# Patient Record
Sex: FEMALE | Race: OTHER | NOT HISPANIC OR LATINO
[De-identification: names, ages, dates, MRNs, and addresses within clinical notes are randomized per-mention and may not be internally consistent; named-entity substitution may affect disease eponyms.]

---

## 2017-08-31 PROBLEM — Z00.00 ENCOUNTER FOR PREVENTIVE HEALTH EXAMINATION: Status: ACTIVE | Noted: 2017-08-31

## 2017-09-06 ENCOUNTER — ASOB RESULT (OUTPATIENT)
Age: 27
End: 2017-09-06

## 2017-09-06 ENCOUNTER — APPOINTMENT (OUTPATIENT)
Dept: ANTEPARTUM | Facility: CLINIC | Age: 27
End: 2017-09-06
Payer: MEDICAID

## 2017-09-06 PROCEDURE — 76805 OB US >/= 14 WKS SNGL FETUS: CPT

## 2017-11-15 ENCOUNTER — OUTPATIENT (OUTPATIENT)
Dept: OUTPATIENT SERVICES | Facility: HOSPITAL | Age: 27
LOS: 1 days | End: 2017-11-15

## 2017-11-15 DIAGNOSIS — O09.90 SUPERVISION OF HIGH RISK PREGNANCY, UNSPECIFIED, UNSPECIFIED TRIMESTER: ICD-10-CM

## 2017-11-15 LAB
APPEARANCE UR: SIGNIFICANT CHANGE UP
BACTERIA # UR AUTO: SIGNIFICANT CHANGE UP
BILIRUB UR-MCNC: NEGATIVE — SIGNIFICANT CHANGE UP
BLD GP AB SCN SERPL QL: NEGATIVE — SIGNIFICANT CHANGE UP
BLOOD UR QL VISUAL: NEGATIVE — SIGNIFICANT CHANGE UP
COLOR SPEC: SIGNIFICANT CHANGE UP
GLUCOSE UR-MCNC: NEGATIVE — SIGNIFICANT CHANGE UP
HCT VFR BLD CALC: 31.1 % — LOW (ref 34.5–45)
HGB BLD-MCNC: 9.2 G/DL — LOW (ref 11.5–15.5)
KETONES UR-MCNC: NEGATIVE — SIGNIFICANT CHANGE UP
LEUKOCYTE ESTERASE UR-ACNC: NEGATIVE — SIGNIFICANT CHANGE UP
MCHC RBC-ENTMCNC: 22.9 PG — LOW (ref 27–34)
MCHC RBC-ENTMCNC: 29.6 % — LOW (ref 32–36)
MCV RBC AUTO: 77.4 FL — LOW (ref 80–100)
MUCOUS THREADS # UR AUTO: SIGNIFICANT CHANGE UP
NITRITE UR-MCNC: NEGATIVE — SIGNIFICANT CHANGE UP
NRBC # FLD: 0 — SIGNIFICANT CHANGE UP
PH UR: 7.5 — SIGNIFICANT CHANGE UP (ref 4.6–8)
PLATELET # BLD AUTO: 235 K/UL — SIGNIFICANT CHANGE UP (ref 150–400)
PMV BLD: 10.2 FL — SIGNIFICANT CHANGE UP (ref 7–13)
PROT UR-MCNC: 30 — HIGH
RBC # BLD: 4.02 M/UL — SIGNIFICANT CHANGE UP (ref 3.8–5.2)
RBC # FLD: 18.2 % — HIGH (ref 10.3–14.5)
RBC CASTS # UR COMP ASSIST: SIGNIFICANT CHANGE UP (ref 0–?)
RH IG SCN BLD-IMP: POSITIVE — SIGNIFICANT CHANGE UP
SP GR SPEC: 1.03 — SIGNIFICANT CHANGE UP (ref 1–1.03)
SQUAMOUS # UR AUTO: SIGNIFICANT CHANGE UP
UROBILINOGEN FLD QL: NORMAL E.U. — SIGNIFICANT CHANGE UP (ref 0.1–0.2)
WBC # BLD: 8.1 K/UL — SIGNIFICANT CHANGE UP (ref 3.8–10.5)
WBC # FLD AUTO: 8.1 K/UL — SIGNIFICANT CHANGE UP (ref 3.8–10.5)
WBC UR QL: SIGNIFICANT CHANGE UP (ref 0–?)

## 2017-11-15 RX ORDER — SODIUM CHLORIDE 9 MG/ML
1000 INJECTION, SOLUTION INTRAVENOUS
Qty: 0 | Refills: 0 | Status: DISCONTINUED | OUTPATIENT
Start: 2017-11-30 | End: 2017-11-30

## 2017-11-15 RX ORDER — METOCLOPRAMIDE HCL 10 MG
10 TABLET ORAL ONCE
Qty: 0 | Refills: 0 | Status: COMPLETED | OUTPATIENT
Start: 2017-11-30 | End: 2017-11-30

## 2017-11-16 LAB — T PALLIDUM AB TITR SER: NEGATIVE — SIGNIFICANT CHANGE UP

## 2017-11-30 ENCOUNTER — INPATIENT (INPATIENT)
Facility: HOSPITAL | Age: 27
LOS: 2 days | Discharge: HOME | End: 2017-12-03
Attending: SPECIALIST | Admitting: SPECIALIST

## 2017-11-30 VITALS — WEIGHT: 149.91 LBS | HEIGHT: 65 IN

## 2017-11-30 DIAGNOSIS — O09.90 SUPERVISION OF HIGH RISK PREGNANCY, UNSPECIFIED, UNSPECIFIED TRIMESTER: ICD-10-CM

## 2017-11-30 LAB
BLD GP AB SCN SERPL QL: NEGATIVE — SIGNIFICANT CHANGE UP
RH IG SCN BLD-IMP: POSITIVE — SIGNIFICANT CHANGE UP

## 2017-11-30 RX ORDER — TETANUS TOXOID, REDUCED DIPHTHERIA TOXOID AND ACELLULAR PERTUSSIS VACCINE, ADSORBED 5; 2.5; 8; 8; 2.5 [IU]/.5ML; [IU]/.5ML; UG/.5ML; UG/.5ML; UG/.5ML
0.5 SUSPENSION INTRAMUSCULAR ONCE
Qty: 0 | Refills: 0 | Status: DISCONTINUED | OUTPATIENT
Start: 2017-11-30 | End: 2017-12-03

## 2017-11-30 RX ORDER — HYDROMORPHONE HYDROCHLORIDE 2 MG/ML
0.5 INJECTION INTRAMUSCULAR; INTRAVENOUS; SUBCUTANEOUS
Qty: 0 | Refills: 0 | Status: DISCONTINUED | OUTPATIENT
Start: 2017-11-30 | End: 2017-11-30

## 2017-11-30 RX ORDER — OXYTOCIN 10 UNIT/ML
41.67 VIAL (ML) INJECTION
Qty: 20 | Refills: 0 | Status: DISCONTINUED | OUTPATIENT
Start: 2017-11-30 | End: 2017-12-01

## 2017-11-30 RX ORDER — SODIUM CHLORIDE 9 MG/ML
1000 INJECTION, SOLUTION INTRAVENOUS
Qty: 0 | Refills: 0 | Status: DISCONTINUED | OUTPATIENT
Start: 2017-11-30 | End: 2017-12-01

## 2017-11-30 RX ORDER — FAMOTIDINE 10 MG/ML
20 INJECTION INTRAVENOUS ONCE
Qty: 0 | Refills: 0 | Status: COMPLETED | OUTPATIENT
Start: 2017-11-30 | End: 2017-11-30

## 2017-11-30 RX ORDER — DOCUSATE SODIUM 100 MG
100 CAPSULE ORAL
Qty: 0 | Refills: 0 | Status: DISCONTINUED | OUTPATIENT
Start: 2017-11-30 | End: 2017-12-01

## 2017-11-30 RX ORDER — OXYCODONE HYDROCHLORIDE 5 MG/1
5 TABLET ORAL
Qty: 0 | Refills: 0 | Status: DISCONTINUED | OUTPATIENT
Start: 2017-11-30 | End: 2017-11-30

## 2017-11-30 RX ORDER — CITRIC ACID/SODIUM CITRATE 300-500 MG
30 SOLUTION, ORAL ORAL ONCE
Qty: 0 | Refills: 0 | Status: COMPLETED | OUTPATIENT
Start: 2017-11-30 | End: 2017-11-30

## 2017-11-30 RX ORDER — IBUPROFEN 200 MG
600 TABLET ORAL EVERY 6 HOURS
Qty: 0 | Refills: 0 | Status: COMPLETED | OUTPATIENT
Start: 2017-11-30 | End: 2018-10-29

## 2017-11-30 RX ORDER — HYDROMORPHONE HYDROCHLORIDE 2 MG/ML
1 INJECTION INTRAMUSCULAR; INTRAVENOUS; SUBCUTANEOUS
Qty: 0 | Refills: 0 | Status: DISCONTINUED | OUTPATIENT
Start: 2017-11-30 | End: 2017-11-30

## 2017-11-30 RX ORDER — NALOXONE HYDROCHLORIDE 4 MG/.1ML
0.1 SPRAY NASAL
Qty: 0 | Refills: 0 | Status: DISCONTINUED | OUTPATIENT
Start: 2017-11-30 | End: 2017-12-01

## 2017-11-30 RX ORDER — ACETAMINOPHEN 500 MG
975 TABLET ORAL EVERY 6 HOURS
Qty: 0 | Refills: 0 | Status: DISCONTINUED | OUTPATIENT
Start: 2017-11-30 | End: 2017-12-03

## 2017-11-30 RX ORDER — OXYCODONE HYDROCHLORIDE 5 MG/1
10 TABLET ORAL
Qty: 0 | Refills: 0 | Status: DISCONTINUED | OUTPATIENT
Start: 2017-11-30 | End: 2017-11-30

## 2017-11-30 RX ORDER — ONDANSETRON 8 MG/1
4 TABLET, FILM COATED ORAL EVERY 6 HOURS
Qty: 0 | Refills: 0 | Status: DISCONTINUED | OUTPATIENT
Start: 2017-11-30 | End: 2017-12-01

## 2017-11-30 RX ORDER — OXYCODONE HYDROCHLORIDE 5 MG/1
5 TABLET ORAL
Qty: 0 | Refills: 0 | Status: COMPLETED | OUTPATIENT
Start: 2017-11-30 | End: 2017-12-07

## 2017-11-30 RX ORDER — LANOLIN
1 OINTMENT (GRAM) TOPICAL
Qty: 0 | Refills: 0 | Status: DISCONTINUED | OUTPATIENT
Start: 2017-11-30 | End: 2017-12-03

## 2017-11-30 RX ORDER — SODIUM CHLORIDE 9 MG/ML
1000 INJECTION, SOLUTION INTRAVENOUS ONCE
Qty: 0 | Refills: 0 | Status: COMPLETED | OUTPATIENT
Start: 2017-11-30 | End: 2017-11-30

## 2017-11-30 RX ORDER — KETOROLAC TROMETHAMINE 30 MG/ML
30 SYRINGE (ML) INJECTION EVERY 6 HOURS
Qty: 0 | Refills: 0 | Status: DISCONTINUED | OUTPATIENT
Start: 2017-11-30 | End: 2017-12-01

## 2017-11-30 RX ORDER — DIPHENHYDRAMINE HCL 50 MG
25 CAPSULE ORAL EVERY 6 HOURS
Qty: 0 | Refills: 0 | Status: DISCONTINUED | OUTPATIENT
Start: 2017-11-30 | End: 2017-12-03

## 2017-11-30 RX ORDER — ONDANSETRON 8 MG/1
4 TABLET, FILM COATED ORAL ONCE
Qty: 0 | Refills: 0 | Status: DISCONTINUED | OUTPATIENT
Start: 2017-11-30 | End: 2017-11-30

## 2017-11-30 RX ORDER — HEPARIN SODIUM 5000 [USP'U]/ML
5000 INJECTION INTRAVENOUS; SUBCUTANEOUS EVERY 12 HOURS
Qty: 0 | Refills: 0 | Status: DISCONTINUED | OUTPATIENT
Start: 2017-11-30 | End: 2017-12-03

## 2017-11-30 RX ORDER — GLYCERIN ADULT
1 SUPPOSITORY, RECTAL RECTAL AT BEDTIME
Qty: 0 | Refills: 0 | Status: DISCONTINUED | OUTPATIENT
Start: 2017-11-30 | End: 2017-12-03

## 2017-11-30 RX ORDER — FERROUS SULFATE 325(65) MG
325 TABLET ORAL DAILY
Qty: 0 | Refills: 0 | Status: DISCONTINUED | OUTPATIENT
Start: 2017-11-30 | End: 2017-12-01

## 2017-11-30 RX ORDER — SIMETHICONE 80 MG/1
80 TABLET, CHEWABLE ORAL EVERY 4 HOURS
Qty: 0 | Refills: 0 | Status: DISCONTINUED | OUTPATIENT
Start: 2017-11-30 | End: 2017-12-03

## 2017-11-30 RX ORDER — BUTORPHANOL TARTRATE 2 MG/ML
0.12 INJECTION, SOLUTION INTRAMUSCULAR; INTRAVENOUS EVERY 6 HOURS
Qty: 0 | Refills: 0 | Status: DISCONTINUED | OUTPATIENT
Start: 2017-11-30 | End: 2017-11-30

## 2017-11-30 RX ORDER — OXYCODONE HYDROCHLORIDE 5 MG/1
5 TABLET ORAL EVERY 4 HOURS
Qty: 0 | Refills: 0 | Status: COMPLETED | OUTPATIENT
Start: 2017-12-01 | End: 2017-12-08

## 2017-11-30 RX ADMIN — SODIUM CHLORIDE 2000 MILLILITER(S): 9 INJECTION, SOLUTION INTRAVENOUS at 14:46

## 2017-11-30 RX ADMIN — Medication 125 MILLIUNIT(S)/MIN: at 19:30

## 2017-11-30 RX ADMIN — SODIUM CHLORIDE 125 MILLILITER(S): 9 INJECTION, SOLUTION INTRAVENOUS at 14:45

## 2017-11-30 RX ADMIN — FAMOTIDINE 20 MILLIGRAM(S): 10 INJECTION INTRAVENOUS at 14:45

## 2017-11-30 RX ADMIN — Medication 10 MILLIGRAM(S): at 14:45

## 2017-11-30 RX ADMIN — Medication 30 MILLILITER(S): at 14:45

## 2017-11-30 NOTE — PROGRESS NOTE ADULT - SUBJECTIVE AND OBJECTIVE BOX
Anesthesia s/p  section PACU note    In OR spinal was converted to a general anesthetic with endotracheal intubation for lack of clarity of the function of the epidural and assessing level according to the patient response.    Patient got spinal fentanyl and morphine only.  No IV narcotics intraoperatively.     Discussed with PACU nurse.  Patient has been comfortable requiring no narcotics.  Nurse said she did fundal pressure and patient was minimally uncomfortable.  After a general applying fundal pressure causes alot of pian.  Nurse report patient has been holding the baby and breast feeding without any problems.    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  Both agree patient has no pain, only when coughs.   agrees that his wife is comfortable.    Impression:    Patient has pain relief from the spinal morphine so wrote spinal narcotic 24 hour pain order protocol.  Felt there is no need for IVPCA.  Patient and  and PACU nurse agree patient has been very comfortable in the PACU.

## 2017-12-01 ENCOUNTER — TRANSCRIPTION ENCOUNTER (OUTPATIENT)
Age: 27
End: 2017-12-01

## 2017-12-01 DIAGNOSIS — Z98.891 HISTORY OF UTERINE SCAR FROM PREVIOUS SURGERY: ICD-10-CM

## 2017-12-01 LAB
BASOPHILS # BLD AUTO: 0.02 K/UL — SIGNIFICANT CHANGE UP (ref 0–0.2)
BASOPHILS NFR BLD AUTO: 0.2 % — SIGNIFICANT CHANGE UP (ref 0–2)
EOSINOPHIL # BLD AUTO: 0.07 K/UL — SIGNIFICANT CHANGE UP (ref 0–0.5)
EOSINOPHIL NFR BLD AUTO: 0.6 % — SIGNIFICANT CHANGE UP (ref 0–6)
HCT VFR BLD CALC: 28.4 % — LOW (ref 34.5–45)
HGB BLD-MCNC: 8.5 G/DL — LOW (ref 11.5–15.5)
IMM GRANULOCYTES # BLD AUTO: 0.06 # — SIGNIFICANT CHANGE UP
IMM GRANULOCYTES NFR BLD AUTO: 0.5 % — SIGNIFICANT CHANGE UP (ref 0–1.5)
LYMPHOCYTES # BLD AUTO: 1.4 K/UL — SIGNIFICANT CHANGE UP (ref 1–3.3)
LYMPHOCYTES # BLD AUTO: 11.7 % — LOW (ref 13–44)
MCHC RBC-ENTMCNC: 22.6 PG — LOW (ref 27–34)
MCHC RBC-ENTMCNC: 29.9 % — LOW (ref 32–36)
MCV RBC AUTO: 75.5 FL — LOW (ref 80–100)
MONOCYTES # BLD AUTO: 0.78 K/UL — SIGNIFICANT CHANGE UP (ref 0–0.9)
MONOCYTES NFR BLD AUTO: 6.5 % — SIGNIFICANT CHANGE UP (ref 2–14)
NEUTROPHILS # BLD AUTO: 9.62 K/UL — HIGH (ref 1.8–7.4)
NEUTROPHILS NFR BLD AUTO: 80.5 % — HIGH (ref 43–77)
NRBC # FLD: 0 — SIGNIFICANT CHANGE UP
PLATELET # BLD AUTO: 169 K/UL — SIGNIFICANT CHANGE UP (ref 150–400)
PMV BLD: 9.9 FL — SIGNIFICANT CHANGE UP (ref 7–13)
RBC # BLD: 3.76 M/UL — LOW (ref 3.8–5.2)
RBC # FLD: 18.2 % — HIGH (ref 10.3–14.5)
WBC # BLD: 11.95 K/UL — HIGH (ref 3.8–10.5)
WBC # FLD AUTO: 11.95 K/UL — HIGH (ref 3.8–10.5)

## 2017-12-01 RX ORDER — FERROUS SULFATE 325(65) MG
325 TABLET ORAL
Qty: 0 | Refills: 0 | Status: DISCONTINUED | OUTPATIENT
Start: 2017-12-01 | End: 2017-12-03

## 2017-12-01 RX ORDER — OXYCODONE HYDROCHLORIDE 5 MG/1
1 TABLET ORAL
Qty: 0 | Refills: 0 | COMMUNITY
Start: 2017-12-01

## 2017-12-01 RX ORDER — IBUPROFEN 200 MG
600 TABLET ORAL EVERY 6 HOURS
Qty: 0 | Refills: 0 | Status: DISCONTINUED | OUTPATIENT
Start: 2017-12-01 | End: 2017-12-03

## 2017-12-01 RX ORDER — OXYCODONE HYDROCHLORIDE 5 MG/1
5 TABLET ORAL EVERY 4 HOURS
Qty: 0 | Refills: 0 | Status: DISCONTINUED | OUTPATIENT
Start: 2017-12-01 | End: 2017-12-03

## 2017-12-01 RX ORDER — OXYCODONE HYDROCHLORIDE 5 MG/1
5 TABLET ORAL
Qty: 0 | Refills: 0 | Status: DISCONTINUED | OUTPATIENT
Start: 2017-12-01 | End: 2017-12-03

## 2017-12-01 RX ORDER — ASCORBIC ACID 60 MG
500 TABLET,CHEWABLE ORAL DAILY
Qty: 0 | Refills: 0 | Status: DISCONTINUED | OUTPATIENT
Start: 2017-12-01 | End: 2017-12-03

## 2017-12-01 RX ORDER — DOCUSATE SODIUM 100 MG
100 CAPSULE ORAL
Qty: 0 | Refills: 0 | Status: DISCONTINUED | OUTPATIENT
Start: 2017-12-01 | End: 2017-12-03

## 2017-12-01 RX ADMIN — HEPARIN SODIUM 5000 UNIT(S): 5000 INJECTION INTRAVENOUS; SUBCUTANEOUS at 12:31

## 2017-12-01 RX ADMIN — Medication 30 MILLIGRAM(S): at 00:40

## 2017-12-01 RX ADMIN — Medication 30 MILLIGRAM(S): at 06:45

## 2017-12-01 RX ADMIN — Medication 1 TABLET(S): at 12:31

## 2017-12-01 RX ADMIN — Medication 975 MILLIGRAM(S): at 18:23

## 2017-12-01 RX ADMIN — Medication 30 MILLIGRAM(S): at 01:10

## 2017-12-01 RX ADMIN — HEPARIN SODIUM 5000 UNIT(S): 5000 INJECTION INTRAVENOUS; SUBCUTANEOUS at 00:40

## 2017-12-01 RX ADMIN — Medication 30 MILLIGRAM(S): at 06:15

## 2017-12-01 RX ADMIN — Medication 325 MILLIGRAM(S): at 12:31

## 2017-12-01 RX ADMIN — Medication 975 MILLIGRAM(S): at 19:01

## 2017-12-01 RX ADMIN — Medication 100 MILLIGRAM(S): at 09:17

## 2017-12-01 RX ADMIN — Medication 30 MILLIGRAM(S): at 12:45

## 2017-12-01 RX ADMIN — Medication 975 MILLIGRAM(S): at 09:17

## 2017-12-01 RX ADMIN — Medication 975 MILLIGRAM(S): at 10:09

## 2017-12-01 RX ADMIN — Medication 600 MILLIGRAM(S): at 18:30

## 2017-12-01 RX ADMIN — Medication 30 MILLIGRAM(S): at 12:30

## 2017-12-01 RX ADMIN — Medication 600 MILLIGRAM(S): at 19:01

## 2017-12-01 RX ADMIN — SIMETHICONE 80 MILLIGRAM(S): 80 TABLET, CHEWABLE ORAL at 12:32

## 2017-12-01 NOTE — PROGRESS NOTE ADULT - SUBJECTIVE AND OBJECTIVE BOX
Postpartum Note,  Section  She is a  27y woman who is now post-operative day: 1    Subjective:  The patient feels well.  She is ambulating.   She is tolerating regular diet.  She denies nausea and vomiting.  She is voiding.  Her pain is controlled.  She reports normal postpartum bleeding.    Vital Signs Last 24 Hrs  T(C): 36.6 (01 Dec 2017 17:18), Max: 37.3 (01 Dec 2017 01:45)  T(F): 97.9 (01 Dec 2017 17:18), Max: 99.1 (01 Dec 2017 01:45)  HR: 91 (01 Dec 2017 17:18) (64 - 103)  BP: 98/64 (01 Dec 2017 17:18) (94/50 - 124/65)  BP(mean): 74 (2017 21:00) (71 - 81)  RR: 18 (01 Dec 2017 17:18) (14 - 21)  SpO2: 96% (01 Dec 2017 17:18) (94% - 100%)    Physical exam:  Gen: NAD  Breast: Soft, nontender, not engorged.  Abdomen: Soft, nontender, no distension , firm uterine fundus at umbilicus.  Incision: Clean, dry, and intact with steri strips  Pelvic: Normal lochia noted  Ext: No calf tenderness    LABS:                        8.5    11.95 )-----------( 169      ( 01 Dec 2017 06:00 )             28.4         Allergies    No Known Allergies    Intolerances      MEDICATIONS  (STANDING):  acetaminophen   Tablet. 975 milliGRAM(s) Oral every 6 hours  diphtheria/tetanus/pertussis (acellular) Vaccine (ADAcel) 0.5 milliLiter(s) IntraMuscular once  ferrous    sulfate 325 milliGRAM(s) Oral daily  heparin  Injectable 5000 Unit(s) SubCutaneous every 12 hours  ibuprofen  Tablet 600 milliGRAM(s) Oral every 6 hours  ketorolac   Injectable 30 milliGRAM(s) IV Push every 6 hours  lactated ringers. 1000 milliLiter(s) (125 mL/Hr) IV Continuous <Continuous>  oxyCODONE    IR 5 milliGRAM(s) Oral every 3 hours  oxytocin Infusion 41.667 milliUNIT(s)/Min (125 mL/Hr) IV Continuous <Continuous>  prenatal multivitamin 1 Tablet(s) Oral daily    MEDICATIONS  (PRN):  butorphanol Injectable 0.125 milliGRAM(s) IV Push every 6 hours PRN Pruritus  diphenhydrAMINE   Capsule 25 milliGRAM(s) Oral every 6 hours PRN Itching  docusate sodium 100 milliGRAM(s) Oral two times a day PRN Stool Softening  glycerin Suppository - Adult 1 Suppository(s) Rectal at bedtime PRN Constipation  HYDROmorphone  Injectable 1 milliGRAM(s) IV Push every 3 hours PRN Severe Pain  lanolin Ointment 1 Application(s) Topical every 3 hours PRN Sore Nipples  naloxone Injectable 0.1 milliGRAM(s) IV Push every 3 minutes PRN For ANY of the following changes in patient status:  A. RR LESS THAN 10 breaths per minute, B. Oxygen saturation LESS THAN 90%, C. Sedation score of 6  ondansetron Injectable 4 milliGRAM(s) IV Push every 6 hours PRN Nausea  oxyCODONE    IR 5 milliGRAM(s) Oral every 3 hours PRN Mild Pain  oxyCODONE    IR 10 milliGRAM(s) Oral every 3 hours PRN Moderate Pain  simethicone 80 milliGRAM(s) Chew every 4 hours PRN Gas        Assessment and Plan  POD # 1s/p  section  Doing well.  Encourage ambulation.

## 2017-12-01 NOTE — PROGRESS NOTE ADULT - SUBJECTIVE AND OBJECTIVE BOX
POST OP DAY  1  s/p   SECTION    SUBJECTIVE:  I'm ok.    PAIN SCALE SCORE: [x] Refer to charted pain scores    THERAPY:  [x ] Spinal morphine   [  ] Epidural morphine   [  ] IV PCA Hydromorphone 1 mg/ml    OBJECTIVE:  Comfortable Appearing    SEDATION SCORE:	  [ x ] Alert	    [  ] Drowsy        [  ] Arousable	[  ] Asleep	[  ] Unresponsive    Side Effects:	  [ x ] None	     [  ] Nausea        [  ] Pruritus        [  ] Weakness   [  ] Numbness        ASSESSMENT/ PLAN   [   ] Discontinue         [  ] Continue    [ x ] Change to prn Analgesics as per primary service.    DOCUMENTATION & VERIFICATION OF CURRENT MEDS [ x ] Done    COMMENTS: No Headache.

## 2017-12-01 NOTE — DISCHARGE NOTE OB - CARE PLAN
Principal Discharge DX:	S/P  section  Goal:	Routine postop care  Instructions for follow-up, activity and diet:	Follow up in 2 weeks / Regular diet & activity as tolerate

## 2017-12-01 NOTE — DISCHARGE NOTE OB - MEDICATION SUMMARY - MEDICATIONS TO TAKE
I will START or STAY ON the medications listed below when I get home from the hospital:    ibuprofen 600 mg oral tablet  -- 1 tab(s) by mouth every 6 hours, As needed, Mild pain or headache  -- Indication: For for pain    oxyCODONE 5 mg oral tablet  -- 1 tab(s) by mouth every 3 hours  -- Indication: For for moderate pain I will START or STAY ON the medications listed below when I get home from the hospital:    ibuprofen 600 mg oral tablet  -- 1 tab(s) by mouth every 6 hours, As Needed  -- Indication: For pain    acetaminophen 325 mg oral tablet  -- 3 tab(s) by mouth every 6 hours, As Needed  -- Indication: For pain    Prenatal Multivitamins with Folic Acid 1 mg oral tablet  -- 1 tab(s) by mouth once a day  -- Indication: For vitamins

## 2017-12-01 NOTE — LACTATION INITIAL EVALUATION - INTERVENTION OUTCOME
demonstrates understanding of teaching/verbalizes understanding/good return demonstration/Assisted pt with positioning to facilitate a deeper latch, encouraged "sandwich technique". Pt educated on hand expression, colostrum noted. Reviewed feeding on demand, recognizing feeding cues and utilizing feeding log. Pt encouraged to room in, safe skin to skin, safe sleep stressed.

## 2017-12-01 NOTE — DISCHARGE NOTE OB - PATIENT PORTAL LINK FT
“You can access the FollowHealth Patient Portal, offered by [facility], by registering with the following website: [email or web address]”

## 2017-12-01 NOTE — PROGRESS NOTE ADULT - SUBJECTIVE AND OBJECTIVE BOX
ANESTHESIA POSTOP CHECK    27y Female POSTOP DAY 1     No COMPLAINTS    NO APPARENT ANESTHESIA COMPLICATIONS

## 2017-12-01 NOTE — PROGRESS NOTE ADULT - SUBJECTIVE AND OBJECTIVE BOX
OB Progress Note:  Delivery, POD#1    S: 28yo POD#1 s/p LTCS . Her pain is well controlled. She is tolerating a clear diet and passing flatus. Denies N/V. Denies CP/SOB/lightheadedness/dizziness.   She is ambulating without difficulty.  Indwelling catheter is in place.    O:   Vital Signs Last 24 Hrs  T(C): 37.2 (01 Dec 2017 06:14), Max: 37.3 (01 Dec 2017 01:45)  T(F): 98.9 (01 Dec 2017 06:14), Max: 99.1 (01 Dec 2017 01:45)  HR: 86 (01 Dec 2017 06:14) (64 - 103)  BP: 108/69 (01 Dec 2017 06:14) (103/56 - 124/65)  BP(mean): 74 (2017 21:00) (71 - 81)  RR: 17 (01 Dec 2017 06:14) (14 - 21)  SpO2: 98% (01 Dec 2017 06:14) (94% - 100%)    Labs:  Blood type: O Positive  Rubella IgG: RPR: Negative                          8.5<L>   11.95<H> >-----------< 169    (  @ 06:00 )             28.4<L>                  PE:  General: NAD  Abdomen: Mildly distended, appropriately tender, incision c/d/i.  Pelvic: Lochia normal  Extremities: NTBL

## 2017-12-01 NOTE — PROGRESS NOTE ADULT - PROBLEM SELECTOR PLAN 1
- Continue current regimen for pain control  - Continue regular diet.  - Increase ambulation.  - Discontinue cesar catheter at 24 hours post-op   - AM CBC hct 28.4  - Incisional dressing removed    Linda Umanzor DO PGY1

## 2017-12-01 NOTE — DISCHARGE NOTE OB - CARE PROVIDER_API CALL
Luna Rios), Obstetrics and Gynecology  [address] [place] [address]  Phone: [phone]  Fax: [phone]

## 2017-12-01 NOTE — DISCHARGE NOTE OB - MATERIALS PROVIDED
Guide to Postpartum Care/[facility] Hearing Screen Program/Shaken Baby Prevention Handout/Breastfeeding Log/[facility]  Screening Program/Back To Sleep Handout

## 2017-12-02 RX ADMIN — HEPARIN SODIUM 5000 UNIT(S): 5000 INJECTION INTRAVENOUS; SUBCUTANEOUS at 13:05

## 2017-12-02 RX ADMIN — Medication 500 MILLIGRAM(S): at 13:21

## 2017-12-02 RX ADMIN — Medication 325 MILLIGRAM(S): at 13:21

## 2017-12-02 RX ADMIN — HEPARIN SODIUM 5000 UNIT(S): 5000 INJECTION INTRAVENOUS; SUBCUTANEOUS at 00:29

## 2017-12-02 RX ADMIN — Medication 100 MILLIGRAM(S): at 06:31

## 2017-12-02 RX ADMIN — Medication 1 TABLET(S): at 15:09

## 2017-12-02 RX ADMIN — Medication 975 MILLIGRAM(S): at 22:30

## 2017-12-02 RX ADMIN — Medication 975 MILLIGRAM(S): at 14:08

## 2017-12-02 RX ADMIN — Medication 600 MILLIGRAM(S): at 14:08

## 2017-12-02 RX ADMIN — Medication 600 MILLIGRAM(S): at 13:35

## 2017-12-02 RX ADMIN — Medication 975 MILLIGRAM(S): at 00:29

## 2017-12-02 RX ADMIN — Medication 600 MILLIGRAM(S): at 01:15

## 2017-12-02 RX ADMIN — Medication 975 MILLIGRAM(S): at 01:15

## 2017-12-02 RX ADMIN — Medication 600 MILLIGRAM(S): at 00:29

## 2017-12-02 RX ADMIN — Medication 600 MILLIGRAM(S): at 21:41

## 2017-12-02 RX ADMIN — Medication 975 MILLIGRAM(S): at 13:10

## 2017-12-02 RX ADMIN — Medication 600 MILLIGRAM(S): at 06:32

## 2017-12-02 RX ADMIN — Medication 325 MILLIGRAM(S): at 19:36

## 2017-12-02 RX ADMIN — Medication 975 MILLIGRAM(S): at 06:31

## 2017-12-02 RX ADMIN — Medication 100 MILLIGRAM(S): at 19:35

## 2017-12-02 RX ADMIN — Medication 600 MILLIGRAM(S): at 22:30

## 2017-12-02 RX ADMIN — Medication 975 MILLIGRAM(S): at 21:41

## 2017-12-02 NOTE — PROGRESS NOTE ADULT - SUBJECTIVE AND OBJECTIVE BOX
Patient assessed at 1010  Subjective  Pain: Patient denies any pain at the time of assessment. Pain being managed well by pain management protocol.  Complaints: None. Patient denies any headache, blur vision, dizziness and/or fatigue after rest  Milestones:  Alert and orientedx3. Out of bed ambulating. Positive flatus. Positive bowel movement. Voiding.  Tolerating a regular diet.  Infant feeding: breastfeeding and formula feeding  Feeding related issues and/or concerns: none    Objective  Vital Signs:  Vital Signs Last 24 Hrs  T(C): 36.7 (02 Dec 2017 05:19), Max: 36.9 (01 Dec 2017 14:00)  T(F): 98 (02 Dec 2017 05:19), Max: 98.4 (01 Dec 2017 14:00)  HR: 69 (02 Dec 2017 05:19) (69 - 91)  BP: 110/68 (02 Dec 2017 05:19) (94/50 - 110/68)  BP(mean): --  RR: 18 (02 Dec 2017 05:19) (18 - 18)  SpO2: 99% (02 Dec 2017 05:19) (96% - 99%)    Labs:                        8.5    11.95 )-----------( 169      ( 01 Dec 2017 06:00 )             28.4     Blood Type: Opositive  Rubella: Immune  RPR: nonreactive    Medications  MEDICATIONS  (STANDING):  acetaminophen   Tablet. 975 milliGRAM(s) Oral every 6 hours  ascorbic acid 500 milliGRAM(s) Oral daily  diphtheria/tetanus/pertussis (acellular) Vaccine (ADAcel) 0.5 milliLiter(s) IntraMuscular once  docusate sodium 100 milliGRAM(s) Oral two times a day  ferrous    sulfate 325 milliGRAM(s) Oral three times a day with meals  heparin  Injectable 5000 Unit(s) SubCutaneous every 12 hours  ibuprofen  Tablet 600 milliGRAM(s) Oral every 6 hours  oxyCODONE    IR 5 milliGRAM(s) Oral every 3 hours  prenatal multivitamin 1 Tablet(s) Oral daily    MEDICATIONS  (PRN):  diphenhydrAMINE   Capsule 25 milliGRAM(s) Oral every 6 hours PRN Itching  glycerin Suppository - Adult 1 Suppository(s) Rectal at bedtime PRN Constipation  lanolin Ointment 1 Application(s) Topical every 3 hours PRN Sore Nipples  oxyCODONE    IR 5 milliGRAM(s) Oral every 4 hours PRN Severe Pain (7 - 10)  simethicone 80 milliGRAM(s) Chew every 4 hours PRN Gas    Assessment  26y/o     Day#2    repeat post-operative  section delivery   Condition: Stable  Past Medical History significant for: none  Current Issues: none  Lung sounds clear bilaterally.  Breasts: soft, nontender  Nipples: intact  Abdomen: Soft, nondistended and nontender. Bowel sounds present. Fundus firm  Abdominal incision: Clean, dry and intact with steri strips. Patient wearing abdominal binder for support.  Vaginal: Lochia light rubra  Extremities: Slight edema noted bilaterally to lower extremities, negative Elsa's Sign, nontender. Positive pedal pulses  Other relevant physical exam findings: none    Plan  Plan: Increase ambulation, analgesia PRN and pain medication protocol standing oxycodone, ibuprofen and acetaminophen.  Diet: Regular diet  Orders: none    Continue routine post-operative and postpartum care.

## 2017-12-03 VITALS
DIASTOLIC BLOOD PRESSURE: 68 MMHG | SYSTOLIC BLOOD PRESSURE: 103 MMHG | OXYGEN SATURATION: 99 % | RESPIRATION RATE: 16 BRPM | TEMPERATURE: 98 F | HEART RATE: 63 BPM

## 2017-12-03 RX ORDER — IBUPROFEN 200 MG
1 TABLET ORAL
Qty: 0 | Refills: 0 | DISCHARGE
Start: 2017-12-03

## 2017-12-03 RX ORDER — ACETAMINOPHEN 500 MG
3 TABLET ORAL
Qty: 0 | Refills: 0 | DISCHARGE
Start: 2017-12-03

## 2017-12-03 RX ADMIN — HEPARIN SODIUM 5000 UNIT(S): 5000 INJECTION INTRAVENOUS; SUBCUTANEOUS at 13:15

## 2017-12-03 RX ADMIN — Medication 1 TABLET(S): at 13:15

## 2017-12-03 RX ADMIN — Medication 600 MILLIGRAM(S): at 05:31

## 2017-12-03 RX ADMIN — Medication 325 MILLIGRAM(S): at 11:48

## 2017-12-03 RX ADMIN — HEPARIN SODIUM 5000 UNIT(S): 5000 INJECTION INTRAVENOUS; SUBCUTANEOUS at 01:05

## 2017-12-03 RX ADMIN — Medication 500 MILLIGRAM(S): at 13:15

## 2017-12-03 RX ADMIN — Medication 325 MILLIGRAM(S): at 13:15

## 2017-12-03 RX ADMIN — Medication 975 MILLIGRAM(S): at 05:31

## 2017-12-03 NOTE — PROGRESS NOTE ADULT - SUBJECTIVE AND OBJECTIVE BOX
Patient assessed at 0950.  Subjective  Pain: Patient denies any pain at the time of assessment. Pain being managed well by pain management protocol.  Complaints: None. Patient denies any headache, blur vision, dizziness and/or fatigue after rest  Milestones:  Alert and orientedx3. Out of bed ambulating. Positive flatus. Positive bowel movement. Voiding.  Tolerating a regular diet.  Infant feeding: breastfeeding and formula feeding  Feeding related issues and/or concerns: none    Objective  Vital Signs:  Vital Signs Last 24 Hrs  T(C): 36.6 (03 Dec 2017 06:00), Max: 36.7 (02 Dec 2017 22:15)  T(F): 97.9 (03 Dec 2017 06:00), Max: 98 (02 Dec 2017 22:15)  HR: 63 (03 Dec 2017 06:00) (63 - 82)  BP: 103/68 (03 Dec 2017 06:00) (103/68 - 106/71)  BP(mean): --  RR: 16 (03 Dec 2017 06:00) (16 - 18)  SpO2: 99% (03 Dec 2017 06:00) (99% - 100%)  Labs:                        8.5    11.95 )-----------( 169      ( 01 Dec 2017 06:00 )             28.4     Blood Type: Opositive  Rubella: Immune  RPR: nonreactive    Medications  MEDICATIONS  (STANDING):  acetaminophen   Tablet. 975 milliGRAM(s) Oral every 6 hours  ascorbic acid 500 milliGRAM(s) Oral daily  diphtheria/tetanus/pertussis (acellular) Vaccine (ADAcel) 0.5 milliLiter(s) IntraMuscular once  docusate sodium 100 milliGRAM(s) Oral two times a day  ferrous    sulfate 325 milliGRAM(s) Oral three times a day with meals  heparin  Injectable 5000 Unit(s) SubCutaneous every 12 hours  ibuprofen  Tablet 600 milliGRAM(s) Oral every 6 hours  oxyCODONE    IR 5 milliGRAM(s) Oral every 3 hours  prenatal multivitamin 1 Tablet(s) Oral daily    MEDICATIONS  (PRN):  diphenhydrAMINE   Capsule 25 milliGRAM(s) Oral every 6 hours PRN Itching  glycerin Suppository - Adult 1 Suppository(s) Rectal at bedtime PRN Constipation  lanolin Ointment 1 Application(s) Topical every 3 hours PRN Sore Nipples  oxyCODONE    IR 5 milliGRAM(s) Oral every 4 hours PRN Severe Pain (7 - 10)  simethicone 80 milliGRAM(s) Chew every 4 hours PRN Gas    Assessment  28y/o     Day#3    repeat post-operative  section delivery   Condition: Stable  Past Medical History significant for: none  Current Issues: none  Lung sounds clear bilaterally.  Breasts: ocasio, nontender  Nipples: intact  Abdomen: Soft, nondistended and nontender. Bowel sounds present. Fundus firm  Abdominal incision: Clean, dry and intact with steri strips. Patient wearing abdominal binder for support.  Vaginal: Lochia light rubra  Extremities: Slight edema noted bilaterally to lower extremities, negative Elsa's Sign, nontender. Positive pedal pulses  Other relevant physical exam findings: none    Plan  Plan: Increase ambulation, analgesia PRN and pain medication protocol standing oxycodone, ibuprofen and acetaminophen.  Diet: Regular diet  discussed breast, nipple and engorgment care for a breastfeeding mother.   Orders: none    Continue routine post-operative and postpartum care.  Patient to be discharge to home today. Discussed discharge planning.

## 2020-01-09 NOTE — DISCHARGE NOTE OB - ABILITY TO HEAR (WITH HEARING AID OR HEARING APPLIANCE IF NORMALLY USED):
EndoVascular Office Note    CHIEF COMPLAINT/REASON FOR VISIT:   Chief Complaint   Patient presents with   • Follow-up     Post evacuation in December   • Referral     PCP Agus Keane   • Dizziness         HISTORY OF PRESENT ILLNESS: Patient states that she has bilateral lower extremity varicose veins however the left posterior fossa varicosity is most symptomatic she states is been present for 50 years since she had her son recently worsening she has used compression in the past it occasionally helps with challenging putting on the compression stockings severity is moderate     She has successful sclero of telangiectasias in her left posterior fossa she states this area feels much better no further symptoms     She had recent evacuation of thrombosed telangiectasias symptoms improved significantly    The patient denies any shortness of breath, chest pain, palpitations, or other cardiac complaints.         Testing: The following were personally visualized and interpreted by me:          Previous testing and procedures:  10/1/19 PROCEDURE PERFORMED:  Ultrasound-guided sclerotherapy of telangiectasias of  the left posterior fossa.     Review of Systems : All review of systems negative except what is reported in HPI    PAST MEDICAL HISTORY:   Past Medical History:   Diagnosis Date   • Abdominal pain, LUQ (left upper quadrant) 2/28/2019   • Anemia    • DM (diabetes mellitus) (CMS/HCC)    • Essential (primary) hypertension    • GERD with esophagitis    • GERD without esophagitis    • Hyperlipidemia    • Hyperthyroidism    • Hyperuricemia 8/21/2018   • Mastalgia 12/20/2019   • Severe obesity (BMI 35.0-39.9) with comorbidity (CMS/HCC) 4/11/2018   • Varicose veins of both lower extremities    • Varicose veins of left lower extremity with pain 3/15/2019       PAST SURGICAL HISTORY:   Past Surgical History:   Procedure Laterality Date   • Ankle surgery     • Tonsillectomy     • Tubal ligation         FAMILY HISTORY:    Family History   Problem Relation Age of Onset   • Other Mother         varicose veins   • Cancer Father         throat       SOCIAL HISTORY:   Social History     Tobacco Use   • Smoking status: Never Smoker   • Smokeless tobacco: Never Used   Substance Use Topics   • Alcohol use: Yes     Frequency: Monthly or less   • Drug use: No       Drug Use:    No                ALLERGIES:   ALLERGIES:  No Known Allergies    PHYSICAL  EXAMINATION  Visit Vitals  /68 (BP Location: RUE - Right upper extremity, Patient Position: Sitting)   Pulse 80   Ht 5' (1.524 m)   Wt 92.7 kg (204 lb 6.4 oz)   BMI 39.92 kg/m²       Physical Exam  Vitals signs and nursing note reviewed.   HENT:      Head: Atraumatic.   Eyes:      Pupils: Pupils are equal, round, and reactive to light.   Neck:      Musculoskeletal: Neck supple.   Cardiovascular:      Rate and Rhythm: Normal rate and regular rhythm.      Pulses:           Femoral pulses are 2+ on the right side and 2+ on the left side.       Popliteal pulses are 2+ on the right side and 2+ on the left side.   Pulmonary:      Breath sounds: Normal breath sounds.   Abdominal:      General: Bowel sounds are normal.      Palpations: Abdomen is soft.   Musculoskeletal: Normal range of motion.   Skin:     General: Skin is warm.      Comments: Area of treated telangiectasias noted with mild hyperpigmentation   Neurological:      Mental Status: She is alert and oriented to person, place, and time.           MEDICATIONS:   Current Outpatient Medications   Medication Sig Dispense Refill   • omeprazole (PRILOSEC) 20 MG capsule TAKE 1 CAPSULE BY MOUTH EVERY DAY 84 capsule 1   • methIMAzole (TAPAZOLE) 5 MG tablet ONE A DAY TO GET THYROID DOWN 90 tablet 3   • doxycycline monohydrate (MONODOX) 100 MG capsule Take 1 capsule by mouth daily. 10 capsule 1   • lidocaine-prilocaine (EMLA) cream Apply to affected leg two hours prior to procedure on 10/1/19 30 g 0   • Elastic Bandages & Supports (MEDICAL  COMPRESSION STOCKINGS) Misc 1 Package daily. 20-30 mmhg thigh high compression stockings. Dx venous reflux 1 each 0   • allopurinol (ZYLOPRIM) 100 MG tablet Take 1 tablet by mouth daily. TAKE 1 TABLET BY MOUTH EVERY DAY 90 tablet 2   • meloxicam (MOBIC) 7.5 MG tablet TAKE 1 TO 2 TABLETS BY MOUTH DAILY AS NEEDED FOR PAIN 60 tablet 0     No current facility-administered medications for this visit.        IMPRESSION/PLAN:    Symptomatic left posterior fossa telangiectasias  -Status post sclerotherapy and evacuation doing well recommended preventative treatment with knee-high over-the-counter compression    Weakness and unsteady gait in the right lower extremity  -May be due to underlying lumbar DJD      Scribe Attestation: Entered by Gunner Jacobs, acting as scribe for Dr. Levi Mclaughlin MD, FACC, [facility]    Provider Attestation: The documentation recorded by the scribe accurately reflects the service I personally performed and the decisions made by me, Levi Mclaughlin MD, FACC, [facility] Mclaughlin MD, FACC, [facility]  [facility]  1/9/2020   Adequate: hears normal conversation without difficulty

## 2021-12-15 ENCOUNTER — TRANSCRIPTION ENCOUNTER (OUTPATIENT)
Age: 31
End: 2021-12-15

## 2021-12-15 ENCOUNTER — OUTPATIENT (OUTPATIENT)
Dept: OUTPATIENT SERVICES | Facility: HOSPITAL | Age: 31
LOS: 1 days | End: 2021-12-15
Payer: MEDICAID

## 2021-12-15 DIAGNOSIS — Z01.818 ENCOUNTER FOR OTHER PREPROCEDURAL EXAMINATION: ICD-10-CM

## 2021-12-15 DIAGNOSIS — Z3A.39 39 WEEKS GESTATION OF PREGNANCY: ICD-10-CM

## 2021-12-15 LAB
ANION GAP SERPL CALC-SCNC: 7 MMOL/L — SIGNIFICANT CHANGE UP (ref 5–17)
APTT BLD: 31.4 SEC — SIGNIFICANT CHANGE UP (ref 27.5–35.5)
BLD GP AB SCN SERPL QL: SIGNIFICANT CHANGE UP
BUN SERPL-MCNC: 12 MG/DL — SIGNIFICANT CHANGE UP (ref 7–18)
CALCIUM SERPL-MCNC: 8.8 MG/DL — SIGNIFICANT CHANGE UP (ref 8.4–10.5)
CHLORIDE SERPL-SCNC: 105 MMOL/L — SIGNIFICANT CHANGE UP (ref 96–108)
CO2 SERPL-SCNC: 27 MMOL/L — SIGNIFICANT CHANGE UP (ref 22–31)
CREAT SERPL-MCNC: 0.74 MG/DL — SIGNIFICANT CHANGE UP (ref 0.5–1.3)
GLUCOSE SERPL-MCNC: 95 MG/DL — SIGNIFICANT CHANGE UP (ref 70–99)
HCT VFR BLD CALC: 32.1 % — LOW (ref 34.5–45)
HGB BLD-MCNC: 9.7 G/DL — LOW (ref 11.5–15.5)
INR BLD: 0.92 RATIO — SIGNIFICANT CHANGE UP (ref 0.88–1.16)
MCHC RBC-ENTMCNC: 24.4 PG — LOW (ref 27–34)
MCHC RBC-ENTMCNC: 30.2 GM/DL — LOW (ref 32–36)
MCV RBC AUTO: 80.7 FL — SIGNIFICANT CHANGE UP (ref 80–100)
NRBC # BLD: 0 /100 WBCS — SIGNIFICANT CHANGE UP (ref 0–0)
PLATELET # BLD AUTO: 174 K/UL — SIGNIFICANT CHANGE UP (ref 150–400)
POTASSIUM SERPL-MCNC: 4.1 MMOL/L — SIGNIFICANT CHANGE UP (ref 3.5–5.3)
POTASSIUM SERPL-SCNC: 4.1 MMOL/L — SIGNIFICANT CHANGE UP (ref 3.5–5.3)
PROTHROM AB SERPL-ACNC: 11 SEC — SIGNIFICANT CHANGE UP (ref 10.6–13.6)
RBC # BLD: 3.98 M/UL — SIGNIFICANT CHANGE UP (ref 3.8–5.2)
RBC # FLD: 17.3 % — HIGH (ref 10.3–14.5)
SARS-COV-2 RNA SPEC QL NAA+PROBE: SIGNIFICANT CHANGE UP
SODIUM SERPL-SCNC: 139 MMOL/L — SIGNIFICANT CHANGE UP (ref 135–145)
WBC # BLD: 6.94 K/UL — SIGNIFICANT CHANGE UP (ref 3.8–10.5)
WBC # FLD AUTO: 6.94 K/UL — SIGNIFICANT CHANGE UP (ref 3.8–10.5)

## 2021-12-16 ENCOUNTER — RESULT REVIEW (OUTPATIENT)
Age: 31
End: 2021-12-16

## 2021-12-16 ENCOUNTER — INPATIENT (INPATIENT)
Facility: HOSPITAL | Age: 31
LOS: 2 days | Discharge: HOME | End: 2021-12-19
Attending: OBSTETRICS & GYNECOLOGY | Admitting: OBSTETRICS & GYNECOLOGY
Payer: MEDICAID

## 2021-12-16 VITALS — HEIGHT: 65 IN

## 2021-12-16 DIAGNOSIS — Z3A.39 39 WEEKS GESTATION OF PREGNANCY: ICD-10-CM

## 2021-12-16 LAB
ABO RH CONFIRMATION: SIGNIFICANT CHANGE UP
T PALLIDUM AB TITR SER: NEGATIVE — SIGNIFICANT CHANGE UP

## 2021-12-16 PROCEDURE — 59514 CESAREAN DELIVERY ONLY: CPT | Mod: AS,U9

## 2021-12-16 PROCEDURE — 88302 TISSUE EXAM BY PATHOLOGIST: CPT | Mod: 26

## 2021-12-16 PROCEDURE — 58605 DIVISION OF FALLOPIAN TUBE: CPT | Mod: AS

## 2021-12-16 PROCEDURE — 86780 TREPONEMA PALLIDUM: CPT

## 2021-12-16 PROCEDURE — 86923 COMPATIBILITY TEST ELECTRIC: CPT

## 2021-12-16 RX ORDER — CITRIC ACID/SODIUM CITRATE 300-500 MG
30 SOLUTION, ORAL ORAL ONCE
Refills: 0 | Status: COMPLETED | OUTPATIENT
Start: 2021-12-16 | End: 2021-12-16

## 2021-12-16 RX ORDER — CEFAZOLIN SODIUM 1 G
2000 VIAL (EA) INJECTION ONCE
Refills: 0 | Status: COMPLETED | OUTPATIENT
Start: 2021-12-16 | End: 2021-12-16

## 2021-12-16 RX ORDER — SODIUM CHLORIDE 9 MG/ML
1000 INJECTION, SOLUTION INTRAVENOUS
Refills: 0 | Status: DISCONTINUED | OUTPATIENT
Start: 2021-12-16 | End: 2021-12-19

## 2021-12-16 RX ORDER — SODIUM CHLORIDE 9 MG/ML
1000 INJECTION, SOLUTION INTRAVENOUS
Refills: 0 | Status: DISCONTINUED | OUTPATIENT
Start: 2021-12-16 | End: 2021-12-17

## 2021-12-16 RX ORDER — KETOROLAC TROMETHAMINE 30 MG/ML
30 SYRINGE (ML) INJECTION EVERY 6 HOURS
Refills: 0 | Status: DISCONTINUED | OUTPATIENT
Start: 2021-12-16 | End: 2021-12-17

## 2021-12-16 RX ORDER — OXYTOCIN 10 UNIT/ML
333.33 VIAL (ML) INJECTION
Qty: 20 | Refills: 0 | Status: DISCONTINUED | OUTPATIENT
Start: 2021-12-16 | End: 2021-12-19

## 2021-12-16 RX ORDER — DIPHENHYDRAMINE HCL 50 MG
25 CAPSULE ORAL EVERY 6 HOURS
Refills: 0 | Status: DISCONTINUED | OUTPATIENT
Start: 2021-12-16 | End: 2021-12-19

## 2021-12-16 RX ORDER — FAMOTIDINE 10 MG/ML
20 INJECTION INTRAVENOUS ONCE
Refills: 0 | Status: COMPLETED | OUTPATIENT
Start: 2021-12-16 | End: 2021-12-16

## 2021-12-16 RX ORDER — SODIUM CHLORIDE 9 MG/ML
1000 INJECTION, SOLUTION INTRAVENOUS ONCE
Refills: 0 | Status: DISCONTINUED | OUTPATIENT
Start: 2021-12-16 | End: 2021-12-17

## 2021-12-16 RX ORDER — ACETAMINOPHEN 500 MG
975 TABLET ORAL EVERY 6 HOURS
Refills: 0 | Status: DISCONTINUED | OUTPATIENT
Start: 2021-12-16 | End: 2021-12-19

## 2021-12-16 RX ORDER — MAGNESIUM HYDROXIDE 400 MG/1
30 TABLET, CHEWABLE ORAL
Refills: 0 | Status: DISCONTINUED | OUTPATIENT
Start: 2021-12-16 | End: 2021-12-19

## 2021-12-16 RX ORDER — IBUPROFEN 200 MG
600 TABLET ORAL EVERY 6 HOURS
Refills: 0 | Status: COMPLETED | OUTPATIENT
Start: 2021-12-16 | End: 2022-11-14

## 2021-12-16 RX ORDER — OXYCODONE HYDROCHLORIDE 5 MG/1
5 TABLET ORAL
Refills: 0 | Status: DISCONTINUED | OUTPATIENT
Start: 2021-12-16 | End: 2021-12-19

## 2021-12-16 RX ORDER — TETANUS TOXOID, REDUCED DIPHTHERIA TOXOID AND ACELLULAR PERTUSSIS VACCINE, ADSORBED 5; 2.5; 8; 8; 2.5 [IU]/.5ML; [IU]/.5ML; UG/.5ML; UG/.5ML; UG/.5ML
0.5 SUSPENSION INTRAMUSCULAR ONCE
Refills: 0 | Status: DISCONTINUED | OUTPATIENT
Start: 2021-12-16 | End: 2021-12-19

## 2021-12-16 RX ORDER — SIMETHICONE 80 MG/1
80 TABLET, CHEWABLE ORAL EVERY 4 HOURS
Refills: 0 | Status: DISCONTINUED | OUTPATIENT
Start: 2021-12-16 | End: 2021-12-19

## 2021-12-16 RX ORDER — INFLUENZA VIRUS VACCINE 15; 15; 15; 15 UG/.5ML; UG/.5ML; UG/.5ML; UG/.5ML
0.5 SUSPENSION INTRAMUSCULAR ONCE
Refills: 0 | Status: DISCONTINUED | OUTPATIENT
Start: 2021-12-16 | End: 2021-12-19

## 2021-12-16 RX ORDER — LANOLIN
1 OINTMENT (GRAM) TOPICAL EVERY 6 HOURS
Refills: 0 | Status: DISCONTINUED | OUTPATIENT
Start: 2021-12-16 | End: 2021-12-19

## 2021-12-16 RX ORDER — HEPARIN SODIUM 5000 [USP'U]/ML
5000 INJECTION INTRAVENOUS; SUBCUTANEOUS EVERY 12 HOURS
Refills: 0 | Status: DISCONTINUED | OUTPATIENT
Start: 2021-12-16 | End: 2021-12-19

## 2021-12-16 RX ADMIN — Medication 100 MILLIGRAM(S): at 18:44

## 2021-12-16 RX ADMIN — SODIUM CHLORIDE 125 MILLILITER(S): 9 INJECTION, SOLUTION INTRAVENOUS at 21:04

## 2021-12-16 RX ADMIN — Medication 1000 MILLIUNIT(S)/MIN: at 20:03

## 2021-12-16 RX ADMIN — Medication 30 MILLILITER(S): at 18:44

## 2021-12-16 RX ADMIN — FAMOTIDINE 20 MILLIGRAM(S): 10 INJECTION INTRAVENOUS at 18:44

## 2021-12-16 NOTE — PATIENT PROFILE OB - FALL HARM RISK - UNIVERSAL INTERVENTIONS
Bed in lowest position, wheels locked, appropriate side rails in place/Call bell, personal items and telephone in reach/Instruct patient to call for assistance before getting out of bed or chair/Non-slip footwear when patient is out of bed/Ash to call system/Physically safe environment - no spills, clutter or unnecessary equipment/Purposeful Proactive Rounding/Room/bathroom lighting operational, light cord in reach

## 2021-12-17 ENCOUNTER — TRANSCRIPTION ENCOUNTER (OUTPATIENT)
Age: 31
End: 2021-12-17

## 2021-12-17 LAB
BASOPHILS # BLD AUTO: 0.02 K/UL — SIGNIFICANT CHANGE UP (ref 0–0.2)
BASOPHILS NFR BLD AUTO: 0.2 % — SIGNIFICANT CHANGE UP (ref 0–2)
EOSINOPHIL # BLD AUTO: 0.13 K/UL — SIGNIFICANT CHANGE UP (ref 0–0.5)
EOSINOPHIL NFR BLD AUTO: 1.5 % — SIGNIFICANT CHANGE UP (ref 0–6)
HCT VFR BLD CALC: 26.5 % — LOW (ref 34.5–45)
HGB BLD-MCNC: 8.2 G/DL — LOW (ref 11.5–15.5)
IMM GRANULOCYTES NFR BLD AUTO: 0.5 % — SIGNIFICANT CHANGE UP (ref 0–1.5)
LYMPHOCYTES # BLD AUTO: 1.19 K/UL — SIGNIFICANT CHANGE UP (ref 1–3.3)
LYMPHOCYTES # BLD AUTO: 14.1 % — SIGNIFICANT CHANGE UP (ref 13–44)
MCHC RBC-ENTMCNC: 25 PG — LOW (ref 27–34)
MCHC RBC-ENTMCNC: 30.9 GM/DL — LOW (ref 32–36)
MCV RBC AUTO: 80.8 FL — SIGNIFICANT CHANGE UP (ref 80–100)
MONOCYTES # BLD AUTO: 0.5 K/UL — SIGNIFICANT CHANGE UP (ref 0–0.9)
MONOCYTES NFR BLD AUTO: 5.9 % — SIGNIFICANT CHANGE UP (ref 2–14)
NEUTROPHILS # BLD AUTO: 6.55 K/UL — SIGNIFICANT CHANGE UP (ref 1.8–7.4)
NEUTROPHILS NFR BLD AUTO: 77.8 % — HIGH (ref 43–77)
NRBC # BLD: 0 /100 WBCS — SIGNIFICANT CHANGE UP (ref 0–0)
PLATELET # BLD AUTO: 139 K/UL — LOW (ref 150–400)
RBC # BLD: 3.28 M/UL — LOW (ref 3.8–5.2)
RBC # FLD: 17.9 % — HIGH (ref 10.3–14.5)
WBC # BLD: 8.43 K/UL — SIGNIFICANT CHANGE UP (ref 3.8–10.5)
WBC # FLD AUTO: 8.43 K/UL — SIGNIFICANT CHANGE UP (ref 3.8–10.5)

## 2021-12-17 RX ADMIN — Medication 30 MILLIGRAM(S): at 01:33

## 2021-12-17 RX ADMIN — Medication 30 MILLIGRAM(S): at 14:59

## 2021-12-17 RX ADMIN — Medication 30 MILLIGRAM(S): at 09:20

## 2021-12-17 RX ADMIN — MAGNESIUM HYDROXIDE 30 MILLILITER(S): 400 TABLET, CHEWABLE ORAL at 13:23

## 2021-12-17 RX ADMIN — Medication 30 MILLIGRAM(S): at 02:00

## 2021-12-17 RX ADMIN — Medication 975 MILLIGRAM(S): at 21:00

## 2021-12-17 RX ADMIN — SIMETHICONE 80 MILLIGRAM(S): 80 TABLET, CHEWABLE ORAL at 06:02

## 2021-12-17 RX ADMIN — Medication 30 MILLIGRAM(S): at 10:20

## 2021-12-17 RX ADMIN — Medication 975 MILLIGRAM(S): at 19:59

## 2021-12-17 RX ADMIN — Medication 30 MILLIGRAM(S): at 15:50

## 2021-12-17 RX ADMIN — HEPARIN SODIUM 5000 UNIT(S): 5000 INJECTION INTRAVENOUS; SUBCUTANEOUS at 09:20

## 2021-12-17 RX ADMIN — SIMETHICONE 80 MILLIGRAM(S): 80 TABLET, CHEWABLE ORAL at 01:36

## 2021-12-17 NOTE — PROGRESS NOTE ADULT - ASSESSMENT
A/P:  31y POD # 1 s/p repeat  and btl @ 39 weeks, currently in stable condition  - f/u trial void after removal of cesar  - Heparin for dvt prophylaxis  - OOB  - incentive spirometry  - dressing removed  - advance diet with flatus  - continue pain mgmt  - continue post op care    DR Lorenzo aware

## 2021-12-17 NOTE — DISCHARGE NOTE OB - NS MD DC FALL RISK RISK
For information on Fall & Injury Prevention, visit: [email or web address] OR  [email or web address] OR  https://www.cdc.gov/steadi/patient.html

## 2021-12-17 NOTE — LACTATION INITIAL EVALUATION - LACTATION INTERVENTIONS
Breastfeeding on cue 8-12X/24 hours with diaper count to assess for adequate intake, safe skin to skin and rooming-in encouraged./initiate/review safe skin-to-skin/initiate/review techniques for position and latch/reviewed components of an effective feeding and at least 8 effective feedings per day required/reviewed importance of monitoring infant diapers, the breastfeeding log, and minimum output each day/reviewed benefits and recommendations for rooming in/reviewed feeding on demand/by cue at least 8 times a day/reviewed indications of inadequate milk transfer that would require supplementation

## 2021-12-17 NOTE — DISCHARGE NOTE OB - PATIENT PORTAL LINK FT
You can access the FollowMyHealth Patient Portal offered by [facility] by registering at the following website: [email or web address]. By joining [employer]’s FollowMyHealth portal, you will also be able to view your health information using other applications (apps) compatible with our system.

## 2021-12-17 NOTE — DISCHARGE NOTE OB - CARE PLAN
1 Principal Discharge DX:	 delivery delivered  Assessment and plan of treatment:	Continue breastfeeding.  Motrin as needed for pain.  Ambulate daily.  No heavy lifting or anything per vagina x 6 weeks - no sex, tampons, douching, tub baths, etc.  Follow up in office in 2 weeks for incision check, and then at 6 weeks for postpartum check.  Secondary Diagnosis:	Acute on chronic blood loss anemia  Assessment and plan of treatment:	Continue iron supplements, vitamin C and stool softeners.  Eat iron rich diet.

## 2021-12-17 NOTE — DISCHARGE NOTE OB - MATERIALS PROVIDED
Vaccinations/[place]  Screening Program/  Immunization Record/Breastfeeding Log/Bottle Feeding Log/Guide to Postpartum Care/Shaken Baby Prevention Handout/Birth Certificate Instructions

## 2021-12-17 NOTE — DISCHARGE NOTE OB - PLAN OF CARE
Continue iron supplements, vitamin C and stool softeners.  Eat iron rich diet. Continue breastfeeding.  Motrin as needed for pain.  Ambulate daily.  No heavy lifting or anything per vagina x 6 weeks - no sex, tampons, douching, tub baths, etc.  Follow up in office in 2 weeks for incision check, and then at 6 weeks for postpartum check.

## 2021-12-17 NOTE — DISCHARGE NOTE OB - HOSPITAL COURSE
Patient is a 31 year old  at 39wks who presents for scheduled repeat c/s and BTL.  S/p uncomplicated procedure and routine post op care.  Iron given for acute on chronic anemia.

## 2021-12-17 NOTE — DISCHARGE NOTE OB - MEDICATION SUMMARY - MEDICATIONS TO TAKE
I will START or STAY ON the medications listed below when I get home from the hospital:    Tylenol Regular Strength 325 mg oral tablet  -- 3 tab(s) by mouth every 6 hours, As needed, Mild Pain (1 - 3)  -- Indication: For as needed for pain    ibuprofen 600 mg oral tablet  -- 1 tab(s) by mouth every 6 hours, As Needed  -- Indication: For as needed for pain    ferrous sulfate 325 mg (65 mg elemental iron) oral tablet  -- 1 tab(s) by mouth 3 times a day   -- Check with your doctor before becoming pregnant.  Do not chew, break, or crush.  May discolor urine or feces.    -- Indication: For anemia    Prenatal Multivitamins with Folic Acid 1 mg oral tablet  -- 1 tab(s) by mouth once a day  -- Indication: For Breastfeeding    Colace 100 mg oral capsule  -- 1 cap(s) by mouth 2 times a day   -- Medication should be taken with plenty of water.    -- Indication: For Stool softener/constipation     Vitamin C 1000 mg oral tablet  -- 1 tab(s) by mouth once a day   -- Indication: For supplement

## 2021-12-17 NOTE — DISCHARGE NOTE OB - CARE PROVIDER_API CALL
Johnathan Lorenzo)  Obstetrics and Gynecology  [address] [facility], [address]  [place] [address]  Phone: [phone]  Fax: [phone]  Follow Up Time: 2 weeks

## 2021-12-18 LAB
BASOPHILS # BLD AUTO: 0.02 K/UL — SIGNIFICANT CHANGE UP (ref 0–0.2)
BASOPHILS NFR BLD AUTO: 0.2 % — SIGNIFICANT CHANGE UP (ref 0–2)
EOSINOPHIL # BLD AUTO: 0.1 K/UL — SIGNIFICANT CHANGE UP (ref 0–0.5)
EOSINOPHIL NFR BLD AUTO: 0.9 % — SIGNIFICANT CHANGE UP (ref 0–6)
HCT VFR BLD CALC: 28.3 % — LOW (ref 34.5–45)
HGB BLD-MCNC: 8.7 G/DL — LOW (ref 11.5–15.5)
IMM GRANULOCYTES NFR BLD AUTO: 0.6 % — SIGNIFICANT CHANGE UP (ref 0–1.5)
LYMPHOCYTES # BLD AUTO: 1.18 K/UL — SIGNIFICANT CHANGE UP (ref 1–3.3)
LYMPHOCYTES # BLD AUTO: 10.4 % — LOW (ref 13–44)
MCHC RBC-ENTMCNC: 24.9 PG — LOW (ref 27–34)
MCHC RBC-ENTMCNC: 30.7 GM/DL — LOW (ref 32–36)
MCV RBC AUTO: 81.1 FL — SIGNIFICANT CHANGE UP (ref 80–100)
MONOCYTES # BLD AUTO: 0.77 K/UL — SIGNIFICANT CHANGE UP (ref 0–0.9)
MONOCYTES NFR BLD AUTO: 6.8 % — SIGNIFICANT CHANGE UP (ref 2–14)
NEUTROPHILS # BLD AUTO: 9.16 K/UL — HIGH (ref 1.8–7.4)
NEUTROPHILS NFR BLD AUTO: 81.1 % — HIGH (ref 43–77)
NRBC # BLD: 0 /100 WBCS — SIGNIFICANT CHANGE UP (ref 0–0)
PLATELET # BLD AUTO: 162 K/UL — SIGNIFICANT CHANGE UP (ref 150–400)
RBC # BLD: 3.49 M/UL — LOW (ref 3.8–5.2)
RBC # FLD: 18.6 % — HIGH (ref 10.3–14.5)
WBC # BLD: 11.3 K/UL — HIGH (ref 3.8–10.5)
WBC # FLD AUTO: 11.3 K/UL — HIGH (ref 3.8–10.5)

## 2021-12-18 RX ORDER — IBUPROFEN 200 MG
600 TABLET ORAL EVERY 6 HOURS
Refills: 0 | Status: DISCONTINUED | OUTPATIENT
Start: 2021-12-18 | End: 2021-12-19

## 2021-12-18 RX ADMIN — Medication 600 MILLIGRAM(S): at 08:38

## 2021-12-18 RX ADMIN — HEPARIN SODIUM 5000 UNIT(S): 5000 INJECTION INTRAVENOUS; SUBCUTANEOUS at 06:03

## 2021-12-18 RX ADMIN — Medication 975 MILLIGRAM(S): at 05:03

## 2021-12-18 RX ADMIN — Medication 975 MILLIGRAM(S): at 06:00

## 2021-12-18 RX ADMIN — Medication 600 MILLIGRAM(S): at 09:38

## 2021-12-18 RX ADMIN — HEPARIN SODIUM 5000 UNIT(S): 5000 INJECTION INTRAVENOUS; SUBCUTANEOUS at 19:00

## 2021-12-19 VITALS
DIASTOLIC BLOOD PRESSURE: 68 MMHG | RESPIRATION RATE: 17 BRPM | OXYGEN SATURATION: 100 % | HEART RATE: 74 BPM | SYSTOLIC BLOOD PRESSURE: 108 MMHG

## 2021-12-19 PROCEDURE — 88302 TISSUE EXAM BY PATHOLOGIST: CPT

## 2021-12-19 PROCEDURE — 59050 FETAL MONITOR W/REPORT: CPT

## 2021-12-19 PROCEDURE — 85025 COMPLETE CBC W/AUTO DIFF WBC: CPT

## 2021-12-19 PROCEDURE — 36415 COLL VENOUS BLD VENIPUNCTURE: CPT

## 2021-12-19 RX ORDER — IBUPROFEN 200 MG
1 TABLET ORAL
Qty: 30 | Refills: 0
Start: 2021-12-19

## 2021-12-19 RX ORDER — ACETAMINOPHEN 500 MG
3 TABLET ORAL
Qty: 0 | Refills: 0 | DISCHARGE
Start: 2021-12-19

## 2021-12-19 RX ORDER — FERROUS SULFATE 325(65) MG
1 TABLET ORAL
Qty: 90 | Refills: 0
Start: 2021-12-19 | End: 2022-01-17

## 2021-12-19 RX ORDER — ASCORBIC ACID 60 MG
1 TABLET,CHEWABLE ORAL
Qty: 30 | Refills: 0
Start: 2021-12-19 | End: 2022-01-17

## 2021-12-19 RX ORDER — DOCUSATE SODIUM 100 MG
1 CAPSULE ORAL
Qty: 30 | Refills: 0
Start: 2021-12-19

## 2021-12-19 RX ADMIN — Medication 975 MILLIGRAM(S): at 07:48

## 2021-12-19 RX ADMIN — Medication 600 MILLIGRAM(S): at 02:35

## 2021-12-19 RX ADMIN — Medication 975 MILLIGRAM(S): at 08:30

## 2021-12-19 RX ADMIN — Medication 600 MILLIGRAM(S): at 12:20

## 2021-12-19 RX ADMIN — Medication 600 MILLIGRAM(S): at 11:39

## 2021-12-19 RX ADMIN — HEPARIN SODIUM 5000 UNIT(S): 5000 INJECTION INTRAVENOUS; SUBCUTANEOUS at 06:40

## 2021-12-19 RX ADMIN — Medication 600 MILLIGRAM(S): at 03:30

## 2021-12-19 NOTE — PROGRESS NOTE ADULT - SUBJECTIVE AND OBJECTIVE BOX
Post Op Note    Patient seen resting comfortably.  No new complaints.  Denies HA, CP, SOB, N/V/D, dizziness, palpitations, worsening abdominal pain, worsening vaginal bleeding, or any other concerns.  Attempting breastfeeding.      Vital Signs Last 24 Hrs  T(C): 36.4 (16 Dec 2021 20:25), Max: 36.4 (16 Dec 2021 20:25)  T(F): 97.5 (16 Dec 2021 20:25), Max: 97.5 (16 Dec 2021 20:25)  HR: 64 (16 Dec 2021 22:30) (60 - 79)  BP: 112/65 (16 Dec 2021 22:30) (93/- - 118/58)  RR: 14 (16 Dec 2021 22:30) (14 - 17)  SpO2: 97% (16 Dec 2021 22:30) (97% - 100%)    Gen: A&O x 3, NAD  Chest: CTABL  Cardiac: S1+S2+ RRR  Breast: Soft, nontender, nonengorged  Abdomen: Nontender, nondistended, +BS, Dressing C/D/I  Gyn: Minimal bleeding  Extremities: Nontender, DTRS 2+, no worsening edema, venodynes intact                          9.7    6.94  )-----------( 174      ( 15 Dec 2021 17:49 )             32.1       A/P:  Patient s/p  section, POD #0.     -Pain management as needed  -Advance as per protocol  -OOB and ambulate  -Repeat CBC   -DC cesar and trial of void  -Advance diet with flatus  -Encourage breastfeeding 
Patient seen resting comfortably.  No new complaints.  Denies HA, CP, SOB, N/V/D, dizziness, palpitations, worsening abdominal pain, worsening vaginal bleeding, or any other concerns. + Ambulation, + void without difficulty, + flatus and tolerating regular diet. Attempting breastfeeding.     Vital Signs Last 24 Hrs  T(C): 36.6 (19 Dec 2021 06:23), Max: 36.7 (18 Dec 2021 18:48)  T(F): 97.9 (19 Dec 2021 06:23), Max: 98 (18 Dec 2021 18:48)  HR: 76 (19 Dec 2021 06:23) (74 - 76)  BP: 109/71 (19 Dec 2021 06:23) (99/64 - 109/71)  RR: 18 (19 Dec 2021 06:23) (16 - 18)  SpO2: 98% (19 Dec 2021 06:23) (98% - 98%)    Gen: A&O x 3, NAD  Chest: CTABL  Cardiac: S1+S2+ RRR  Breast: Soft, nontender, nonengorged  Abdomen: Nontender, nondistended, +BS, Incision clean dry and intact with steris in place   Gyn: Minimal bleeding  Extremities: Nontender, DTRS 2+, no worsening edema                          8.7    11.30 )-----------( 162      ( 18 Dec 2021 06:55 )             28.3         A/P:  Patient s/p  section, POD #3 with acute on chronic anemia.  Stable for discharge .    -Discharge home with instructions given  -Ambulate daily  -Iron supplementation for anemia  -Pain management as needed  -Encourage breastfeeding  -Follow up in office in 2 weeks for incision check    Attending aware     
  OBGYN PA NOTE POD1   Patient seen and evaluated at bedside,  resting comfortably w/o any acute  complaints.  Denies fever, HA, CP, SOB, N/V/D, dizziness, palpitations, worsening abdominal pain, worsening vaginal bleeding, or any other concerns.  Matson recently removed, due to void. Denies flatus and tolerating clear diet.   at bedside. Patient is attempting to breastfeed.     Vital Signs Last 24 Hrs  T(C): 36.9 (17 Dec 2021 05:18), Max: 36.9 (17 Dec 2021 00:30)  T(F): 98.4 (17 Dec 2021 05:18), Max: 98.4 (17 Dec 2021 00:30)  HR: 72 (17 Dec 2021 05:18) (60 - 79)  BP: 95/60 (17 Dec 2021 05:18) (93/- - 118/58)  BP(mean): --  RR: 18 (17 Dec 2021 05:18) (14 - 18)  SpO2: 98% (17 Dec 2021 05:18) (96% - 100%)    Physical Exam:     Gen: A&Ox 3, NAD  Chest: CTAB/L  Cardiac: S1+S2+ RRR  Breast: Soft, nontender, nonengorged  Abdomen: Soft, nontender, Fundus firm below umbilicus, +BS. dressing clean and dry  Gyn: Mild lochia,   Extremities: Nontender, DTRS 2+, no worsening edema                          8.2    8.43  )-----------( 139      ( 17 Dec 2021 06:35 )             26.5

## 2023-10-31 NOTE — LACTATION INITIAL EVALUATION - DELIVERY MODE
DISCHARGE ISSUE - POOR ENGAGEMENT WITH SUPPORTS/FAMILY DISCHARGE ISSUE - POOR ENGAGEMENT WITH SUPPORTS/FAMILY DISCHARGE ISSUE - POOR ENGAGEMENT WITH SUPPORTS/FAMILY DISCHARGE ISSUE - POOR ENGAGEMENT WITH SUPPORTS/FAMILY breast DISCHARGE ISSUE - POOR ENGAGEMENT WITH SUPPORTS/FAMILY DISCHARGE ISSUE - POOR ENGAGEMENT WITH SUPPORTS/FAMILY DISCHARGE ISSUE - POOR ENGAGEMENT WITH SUPPORTS/FAMILY DISCHARGE ISSUE - POOR ENGAGEMENT WITH SUPPORTS/FAMILY DISCHARGE ISSUE - POOR ENGAGEMENT WITH SUPPORTS/FAMILY DISCHARGE ISSUE - POOR ENGAGEMENT WITH SUPPORTS/FAMILY DISCHARGE ISSUE - POOR ENGAGEMENT WITH SUPPORTS/FAMILY DISCHARGE ISSUE - POOR ENGAGEMENT WITH SUPPORTS/FAMILY DISCHARGE ISSUE - POOR ENGAGEMENT WITH SUPPORTS/FAMILY